# Patient Record
Sex: MALE | Race: WHITE | Employment: FULL TIME | URBAN - METROPOLITAN AREA
[De-identification: names, ages, dates, MRNs, and addresses within clinical notes are randomized per-mention and may not be internally consistent; named-entity substitution may affect disease eponyms.]

---

## 2024-01-03 ENCOUNTER — CLINICAL SUPPORT (OUTPATIENT)
Dept: AUDIOLOGY | Facility: CLINIC | Age: 34
End: 2024-01-03
Payer: COMMERCIAL

## 2024-01-03 ENCOUNTER — OFFICE VISIT (OUTPATIENT)
Dept: OTOLARYNGOLOGY | Facility: CLINIC | Age: 34
End: 2024-01-03
Payer: COMMERCIAL

## 2024-01-03 ENCOUNTER — APPOINTMENT (OUTPATIENT)
Dept: OTOLARYNGOLOGY | Facility: CLINIC | Age: 34
End: 2024-01-03
Payer: COMMERCIAL

## 2024-01-03 DIAGNOSIS — H93.13 TINNITUS OF BOTH EARS: Primary | ICD-10-CM

## 2024-01-03 DIAGNOSIS — R29.898 NECK TIGHTNESS: ICD-10-CM

## 2024-01-03 DIAGNOSIS — H93.8X3 EAR FULLNESS, BILATERAL: ICD-10-CM

## 2024-01-03 DIAGNOSIS — M26.609 TMJ DYSFUNCTION: ICD-10-CM

## 2024-01-03 PROCEDURE — 92557 COMPREHENSIVE HEARING TEST: CPT | Performed by: AUDIOLOGIST

## 2024-01-03 PROCEDURE — 99203 OFFICE O/P NEW LOW 30 MIN: CPT

## 2024-01-03 PROCEDURE — 1036F TOBACCO NON-USER: CPT

## 2024-01-03 PROCEDURE — 92550 TYMPANOMETRY & REFLEX THRESH: CPT | Performed by: AUDIOLOGIST

## 2024-01-03 ASSESSMENT — PAIN - FUNCTIONAL ASSESSMENT: PAIN_FUNCTIONAL_ASSESSMENT: 0-10

## 2024-01-03 ASSESSMENT — ENCOUNTER SYMPTOMS: OCCASIONAL FEELINGS OF UNSTEADINESS: 0

## 2024-01-03 ASSESSMENT — PAIN SCALES - GENERAL: PAINLEVEL_OUTOF10: 0 - NO PAIN

## 2024-01-03 NOTE — PROGRESS NOTES
AUDIOLOGY ADULT AUDIOMETRIC EVALUATION      Name:  Priyank Hall  :  1990  Age:  33 y.o.  Date of Evaluation: 24    History:  Reason for visit:  Mr. Priyank Hall was seen today as part of the visit with REGINALD Montes for an evaluation of hearing.   Chief Complaint   Patient presents with    Tinnitus     The patient stated near the end of December he experienced a significant acute onset of bilateral tinnitus. Stated he heard a constant loud squealing sound in his ears. Reported the tinnitus volume was very loud and he was severely bothered by the tinnitus. Admitted he did have some neck/joint/body pains and a significant head pain/pressure.  Stated he noticed some hyperacusis and sound sensitivity, and wore hearing protection even during average conversations.   Since the initial onset along with lifestyle modifications and some antibiotics, he has noticed a significant reduction in the volume of his tinnitus.  Denied any hearing loss, otalgia, aural fullness, ear pressure, dizziness/vertigo, ear surgery, recent ear/sinus infections, recent falls, significant noise exposure, sinus/throat concerns, ear drainage, or sudden hearing loss.    EVALUATION      See Audiogram    RESULTS:    Otoscopic Evaluation:   Right Ear: Otoscopy for the right ear revealed a clear healthy canal and a healthy tympanic membrane was visualized.   Left Ear: Otoscopy for the left ear revealed a clear healthy canal and a healthy tympanic membrane was visualized.     Immittance:  Immittance Measures: 226 Hz   Right Ear: Tympanometric testing revealed a normal type A tympanogram with normal middle ear pressure and normal static compliance.  Left Ear: Tympanometric testing revealed a normal type A tympanogram with normal middle ear pressure and normal static compliance.    Right Ear: Ipsilateral acoustic reflexes were present at, 500-2,000 Hz, at expected sensation levels and absent at 4,000 Hz.  Left Ear: Ipsilateral  acoustic reflexes were present at, 500-2,000 Hz, at expected sensation levels and absent at 4,000 Hz.    Test technique:  Pure Tone Audiometry via insert earphones    Reliability:   excellent    Pure Tone Audiometry:    Right Ear: Audiometric testing indicated normal peripheral hearing sensitivity from 125-8,000 Hz.  Left Ear:   Audiometric testing indicated normal peripheral hearing sensitivity from 125-8,000 Hz.      Speech Audiometry:   Right Ear:  Speech Reception Threshold (SRT) was obtained at 10 dBHL                  Word Recognition scores were excellent (100%) in quiet when words were presented at 50 dBHL  Left Ear:  Speech Reception Threshold (SRT) was obtained at 10 dBHL                 Word Recognition scores were excellent (100%) in quiet when words were presented at 50 dBHL  Testing was performed with recorded NU-6 speech words in quiet. Speech thresholds were in good agreement with the pure tone averages in each ear.     Distortion Product Otoacoustic Emissions:        Right Ear: Present and robust from 1,000-6,000 Hz.        Left Ear:  Present and robust from 1,000-6,000 Hz.   Present OAEs suggest normal cochlear outer hair cell function.  Absent OAEs are consistent with some degree of hearing loss.    IMPRESSIONS:  Today's test results are normal hearing sensitivity.  The patient was counseled with regard to the findings.    RECOMMENDATIONS:  * Continue medical follow up with REGINALD Montes.  * Retest as medically indicated, or sooner if a change in hearing sensitivity or tinnitus is noticed.   * Wear hearing protection while in the presence of loud sounds.   * Use tinnitus coping strategies as needed, such as sound apps on a smart phone, utilizing calming noise in the room, running a fan at night, etc.     PATIENT EDUCATION:   Discussed results and recommendations with the patient and a copy of the hearing test was provided.  Questions were addressed and the patient was encouraged to  contact our department should concerns arise.  The patient was seen from  2:00-2:30 pm.

## 2024-01-03 NOTE — PROGRESS NOTES
Patient ID: Priyank Hall is a 33 y.o. male who presents for the evaluation of bilateral tinnitus and bilateral aural fullness.     PROVIDER IMPRESSIONS:  DIAGNOSES/PROBLEMS:  -Tinnitus, bilateral  -Ear fullness, bilateral  -Neck tightness  -TMJ dysfunction     ASSESSMENT:   Priyank Hall is a pleasant 33 y.o. male who presents with symptoms of bilateral non-pulsatile tinnitus and intermittent bilateral aural fullness.   Based on the clinical information provided, symptoms and clinical exam findings are consistent TMJ dysfunction vs. Cervicogenic/postural conditions. However, I explained to the patient that audiogram is required for hearing evaluation to determine a definitive diagnosis.   Reassurance provided to patient that otologic exam today revealed no evidence of acute infection/inflammation in the external auditory canal (EAC) bilaterally and that tympanic membrane (TM) appears with no evidence of infection, effusion, retraction or perforation bilaterally.   The etiology of temporomandibular joint disorders (TMD) was discussed in detail with patient including: structural issues such as alterations in dental occlusion (the alignment of the upper and lower teeth) that affect maxillomandibular position and function. These issues may or may not be associated with joint trauma, poor posture or cervicogenic etiologies. Possible psychologic factors include anxiety, depression and PTSD. Multiple other contributing and comorbid factors, including biological, behavioral, environmental, and cognitive disorders which can all contribute to the development of symptoms were also reviewed with the patient.     PLAN:  I recommended audiogram to further investigate the etiology of tinnitus. Patient instructed to schedule for audiogram.   I recommended completion of antibiotic course of p.o. amoxicillin prescribed by the urgent care on 12/31/2023. I advised that the patient discontinues antibiotic medication course if he  experiences any new/worsening adverse GI effects.   I recommended the following strategies for TMJ symptom management:    Soft diet: for the next 2 weeks, please avoid eating chewy or tough foods such as hamburgers, hot dogs, pizza, steak, meats, raw fruits and vegetables, or anything else that requires significant mastication. Examples of appropriate soft foods include mashed potatoes, soft pasta, macaroni, yogurt, ice cream, and other things that could easily be mashed with a fork.   Temple and cheekbone massages: using your knuckles, gently massage in circles near temples and along your cheekbones as tolerated.   Warm or cold compresses: apply along the entire side of the affected face for no more than 20 minutes at a time, remove sooner if skin irritation occurs.   Mouth guard: consider purchase of a mouth guard to prevent jaw clenching and/or teeth grinding during sleep.   Ibuprofen: If facial pain is present, you may start over-the-counter ibuprofen 600 mg three times a day for three days only with meals. Please be advised regarding side effects of upset stomach and ulcers if ibuprofen is taken on an empty stomach. Avoid NSAIDs if you have previously been advised against use.   Follow-up: Patient may schedule for follow-up after audiogram evaluation. They may follow-up sooner, if needed. Patient is agreeable to this plan, all questions were answered to patient's satisfaction.     Subjective   HPI: Priyank Hall is a 33 y.o. male who presents for the evaluation of tinnitus in both ears. The patient states that symptom onset began approximately 4-5 weeks ago. He describes tinnitus as non-pulsatile ringing that was the most severe at initial onset and has gradually improved since then but not resolved. He reports that massaging his temples/cheeks and taking ibuprofen as needed improves the tinnitus intensity slightly. Mentions that he typically notices tinnitus gradually worsens over the course of the day, but  improves when he is in quieter environments. Denies nighttime wakening due to tinnitus but does believe that tinnitus is affecting his quality of life. When asked about the presence of hearing loss, ear pain, ear fullness/pressure, tinnitus, ear itching, ear drainage, autophony, dizziness or vertigo, he admits to bilateral aural pressure. When asked about pertinent otologic history, the patient denies a history of recurrent ear infections, denies history of ear surgery, denies history of PE tube insertion, and denies history of prolonged/traumatic loud noise exposure. The patient does not insert Q-tips in the ear canals, and  denies insertion of other foreign objects into the ear canals. The patient denies history of wearing hearing aid devices. The patient does not endorse a family history of hearing loss. Denies history of migraines, head/neck injury or trauma. States that he saw a chiropractor approximately 2-3 weeks ago for muscle soreness/cramps and had treatment on the neck. 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       PATIENT HISTORY:  No past medical history on file.   No past surgical history on file.   Not on File   No current outpatient medications on file.   Tobacco Use: Not on file      Alcohol Use: Not on file      Social History     Substance and Sexual Activity   Drug Use Not on file        Review of Systems   All other systems negative.     Objective   There were no vitals taken for this visit.     PHYSICAL EXAM:  General appearance: Appears well, well-nourished, well groomed. No acute distress.   Constitutional: No fever, chills, weight loss or weight gain.  Communication: Normal communication  Psychiatric: Oriented to person, place and time. Normal mood and affect.  Neurologic: Cranial nerves II-XII grossly intact and symmetric bilaterally.  Cardiovascular: Examination of peripheral vascular system  shows no clubbing or cyanosis.  Respiratory: No respiratory distress increased work of breathing. Inspection of the chest with symmetric chest expansion and normal respiratory effort.  Skin: No head and neck rashes.  Head: Normocephalic. Atraumatic with no masses, lesions or scarring.  Face: Normal symmetry. No scars or deformities.  Eyes: Conjunctiva not edematous or erythematous. PERRLA  Neck: Supple and symmetric, trachea midline. Lymph nodes with no adenopathy.  Head: Normocephalic. Atraumatic with no masses, lesions or scarring.  Eyes: PERRL, EOMI, Conjunctiva is clear. No nystagmus.  Nose: External inspection of nose: No nasal lesions, lacerations or scars. No tenderness on frontal or maxillary sinus palpation.  Throat:  Floor of mouth is clear, no masses.  Tongue appears normal, no lesions or masses. Gums, gingiva, buccal mucosa appear pink and moist, no lesions. Teeth are in intact.  No obvious dental infections.  Peritonsillar regions appear symmetric without swelling.  Hard and soft palate appear normal, no obvious cleft. Uvula is midline.  Oropharynx: No lesions. Retropharyngeal wall is flat.  []postnasal drip.  Salivary Glands: Symmetric bilaterally.  No palpable masses.  No evidence of acute infection or salivary stones.  TMJ: Normal, no trismus.  Right Ear: External inspection of ear with no deformity, scars, or masses. Mastoid is nontender. External auditory canal is clear.  TM is intact with no sign of infection, effusion, or retraction.  No perforation seen. Auto insufflation visible under microscopy.  Left Ear: External inspection of ear with no deformity, scars, or masses. Mastoid is nontender. External auditory canal clear. Tympanic membrane is intact with no sign of infection, effusion, or retraction.  No perforation seen. Auto insufflation visible under microscopy.      Yanelis Gill, APRN-CNP

## 2024-01-03 NOTE — LETTER
January 3, 2024     REGINALD Killian  [address] [place] [address]    Patient: Priyank Hall   YOB: 1990   Date of Visit: 1/3/2024       Dear REGINALD Abreu:    Thank you for referring Priyank Hall to me for evaluation. Below are my notes for this consultation.  If you have questions, please do not hesitate to call me. I look forward to following your patient along with you.       Sincerely,     Verna Robertson, MANNY, CCC-A      CC: No Recipients  ______________________________________________________________________________________    AUDIOLOGY ADULT AUDIOMETRIC EVALUATION      Name:  Priyank Hall  :  1990  Age:  33 y.o.  Date of Evaluation: 24    History:  Reason for visit:  Mr. Priyank Hall was seen today as part of the visit with REGINALD Montes for an evaluation of hearing.   Chief Complaint   Patient presents with   • Tinnitus     The patient stated near the end of December he experienced a significant acute onset of bilateral tinnitus. Stated he heard a constant loud squealing sound in his ears. Reported the tinnitus volume was very loud and he was severely bothered by the tinnitus. Admitted he did have some neck/joint/body pains and a significant head pain/pressure.  Stated he noticed some hyperacusis and sound sensitivity, and wore hearing protection even during average conversations.   Since the initial onset along with lifestyle modifications and some antibiotics, he has noticed a significant reduction in the volume of his tinnitus.  Denied any hearing loss, otalgia, aural fullness, ear pressure, dizziness/vertigo, ear surgery, recent ear/sinus infections, recent falls, significant noise exposure, sinus/throat concerns, ear drainage, or sudden hearing loss.    EVALUATION      See Audiogram    RESULTS:    Otoscopic Evaluation:   Right Ear: Otoscopy for the right ear revealed a clear healthy canal and a healthy tympanic  membrane was visualized.   Left Ear: Otoscopy for the left ear revealed a clear healthy canal and a healthy tympanic membrane was visualized.     Immittance:  Immittance Measures: 226 Hz   Right Ear: Tympanometric testing revealed a normal type A tympanogram with normal middle ear pressure and normal static compliance.  Left Ear: Tympanometric testing revealed a normal type A tympanogram with normal middle ear pressure and normal static compliance.    Right Ear: Ipsilateral acoustic reflexes were present at, 500-2,000 Hz, at expected sensation levels and absent at 4,000 Hz.  Left Ear: Ipsilateral acoustic reflexes were present at, 500-2,000 Hz, at expected sensation levels and absent at 4,000 Hz.    Test technique:  Pure Tone Audiometry via insert earphones    Reliability:   excellent    Pure Tone Audiometry:    Right Ear: Audiometric testing indicated normal peripheral hearing sensitivity from 125-8,000 Hz.  Left Ear:   Audiometric testing indicated normal peripheral hearing sensitivity from 125-8,000 Hz.      Speech Audiometry:   Right Ear:  Speech Reception Threshold (SRT) was obtained at 10 dBHL                  Word Recognition scores were excellent (100%) in quiet when words were presented at 50 dBHL  Left Ear:  Speech Reception Threshold (SRT) was obtained at 10 dBHL                 Word Recognition scores were excellent (100%) in quiet when words were presented at 50 dBHL  Testing was performed with recorded NU-6 speech words in quiet. Speech thresholds were in good agreement with the pure tone averages in each ear.     Distortion Product Otoacoustic Emissions:        Right Ear: Present and robust from 1,000-6,000 Hz.        Left Ear:  Present and robust from 1,000-6,000 Hz.   Present OAEs suggest normal cochlear outer hair cell function.  Absent OAEs are consistent with some degree of hearing loss.    IMPRESSIONS:  Today's test results are normal hearing sensitivity.  The patient was counseled with regard  to the findings.    RECOMMENDATIONS:  * Continue medical follow up with REGINALD Montes.  * Retest as medically indicated, or sooner if a change in hearing sensitivity or tinnitus is noticed.   * Wear hearing protection while in the presence of loud sounds.   * Use tinnitus coping strategies as needed, such as sound apps on a smart phone, utilizing calming noise in the room, running a fan at night, etc.     PATIENT EDUCATION:   Discussed results and recommendations with the patient and a copy of the hearing test was provided.  Questions were addressed and the patient was encouraged to contact our department should concerns arise.  The patient was seen from  2:00-2:30 pm.

## 2024-01-17 DIAGNOSIS — R29.898 NECK TIGHTNESS: ICD-10-CM

## 2024-01-17 DIAGNOSIS — M26.609 TMJ DYSFUNCTION: Primary | ICD-10-CM

## 2024-01-17 DIAGNOSIS — H93.13 TINNITUS OF BOTH EARS: ICD-10-CM

## 2024-01-17 NOTE — PROGRESS NOTES
Priyank Hall is a 33 y.o.  male who saw me for bilateral tinnitus and diagnosed with TMJ dysfunction and neck tightness as contributing symptom etiologies.     Patient contacted my office on 1/16/2024 with request for referral to TMJ Physical Therapy for tinnitus symptoms. Per previous visit discussion, I will e-submit at referral for TMJ Physical Therapy today as adjunctive therapy with TMJ home management strategies.     My , Adrienne Panchal, will call patient and inform him that referral was placed today per his request. He may schedule follow-up with me if he has any questions/concerns.

## 2024-11-13 ENCOUNTER — APPOINTMENT (OUTPATIENT)
Dept: NEUROLOGY | Facility: HOSPITAL | Age: 34
End: 2024-11-13
Payer: MEDICAID

## 2025-02-14 ENCOUNTER — APPOINTMENT (OUTPATIENT)
Dept: NEUROLOGY | Facility: CLINIC | Age: 35
End: 2025-02-14
Payer: MEDICAID